# Patient Record
Sex: MALE | Race: WHITE | Employment: FULL TIME | URBAN - METROPOLITAN AREA
[De-identification: names, ages, dates, MRNs, and addresses within clinical notes are randomized per-mention and may not be internally consistent; named-entity substitution may affect disease eponyms.]

---

## 2019-04-12 ENCOUNTER — HOSPITAL ENCOUNTER (EMERGENCY)
Age: 59
Discharge: HOME | End: 2019-04-12
Payer: COMMERCIAL

## 2019-04-12 ENCOUNTER — APPOINTMENT (OUTPATIENT)
Dept: CT IMAGING | Age: 59
End: 2019-04-12
Payer: COMMERCIAL

## 2019-04-12 VITALS
SYSTOLIC BLOOD PRESSURE: 112 MMHG | TEMPERATURE: 99.3 F | WEIGHT: 190 LBS | OXYGEN SATURATION: 99 % | HEART RATE: 77 BPM | BODY MASS INDEX: 27.2 KG/M2 | RESPIRATION RATE: 16 BRPM | DIASTOLIC BLOOD PRESSURE: 65 MMHG | HEIGHT: 70 IN

## 2019-04-12 DIAGNOSIS — G43.909 MIGRAINE WITHOUT STATUS MIGRAINOSUS, NOT INTRACTABLE, UNSPECIFIED MIGRAINE TYPE: Primary | ICD-10-CM

## 2019-04-12 PROCEDURE — 6370000000 HC RX 637 (ALT 250 FOR IP): Performed by: PHYSICIAN ASSISTANT

## 2019-04-12 PROCEDURE — 6360000002 HC RX W HCPCS: Performed by: PHYSICIAN ASSISTANT

## 2019-04-12 PROCEDURE — 96374 THER/PROPH/DIAG INJ IV PUSH: CPT

## 2019-04-12 PROCEDURE — 70450 CT HEAD/BRAIN W/O DYE: CPT

## 2019-04-12 PROCEDURE — 99284 EMERGENCY DEPT VISIT MOD MDM: CPT

## 2019-04-12 PROCEDURE — 96375 TX/PRO/DX INJ NEW DRUG ADDON: CPT

## 2019-04-12 RX ORDER — BUTALBITAL, ACETAMINOPHEN AND CAFFEINE 300; 40; 50 MG/1; MG/1; MG/1
1 CAPSULE ORAL EVERY 4 HOURS PRN
Qty: 20 CAPSULE | Refills: 0 | Status: SHIPPED | OUTPATIENT
Start: 2019-04-12

## 2019-04-12 RX ORDER — KETOROLAC TROMETHAMINE 30 MG/ML
30 INJECTION, SOLUTION INTRAMUSCULAR; INTRAVENOUS ONCE
Status: COMPLETED | OUTPATIENT
Start: 2019-04-12 | End: 2019-04-12

## 2019-04-12 RX ORDER — DIPHENHYDRAMINE HCL 25 MG
25 TABLET ORAL ONCE
Status: COMPLETED | OUTPATIENT
Start: 2019-04-12 | End: 2019-04-12

## 2019-04-12 RX ORDER — METOCLOPRAMIDE HYDROCHLORIDE 5 MG/ML
10 INJECTION INTRAMUSCULAR; INTRAVENOUS ONCE
Status: COMPLETED | OUTPATIENT
Start: 2019-04-12 | End: 2019-04-12

## 2019-04-12 RX ADMIN — METOCLOPRAMIDE 10 MG: 5 INJECTION, SOLUTION INTRAMUSCULAR; INTRAVENOUS at 12:00

## 2019-04-12 RX ADMIN — DIPHENHYDRAMINE HCL 25 MG: 25 TABLET ORAL at 12:08

## 2019-04-12 RX ADMIN — KETOROLAC TROMETHAMINE 30 MG: 30 INJECTION, SOLUTION INTRAMUSCULAR at 11:54

## 2019-04-12 ASSESSMENT — PAIN SCALES - GENERAL
PAINLEVEL_OUTOF10: 9
PAINLEVEL_OUTOF10: 9

## 2019-04-12 ASSESSMENT — PAIN DESCRIPTION - PAIN TYPE: TYPE: ACUTE PAIN

## 2019-04-12 ASSESSMENT — PAIN DESCRIPTION - LOCATION: LOCATION: HEAD

## 2019-04-12 NOTE — ED PROVIDER NOTES
eMERGENCY dEPARTMENT eNCOUnter      PCP: Deepa Vinson MD    [address] [facility]    Chief Complaint   Patient presents with    Headache     since [phone] last night, pain to whole head, pt reports sensitivity to light, vision problems to L eye, emesis       HPI    Barbara Canela is a 59 y.o. male who presents with gradual onset of a headache since onset  last night. Patient symptoms began with black spots in his peripheral vision of his left eye and began having headache diffusely across forehead. He has light sensitivity, nausea and vomiting associated. Pain severity on arrival is 9 out of 10. Patient admits to history of migraines which typically starts with visual changes in the peripheral aspect of left eye. He states he does not have migraines frequently, only a handful in the past several years. He states symptoms began after facial trauma with nasal fracture several years ago. He was prescribed Imitrex by his primary care doctor, however forgot that he had this medication and not take it when he was having and the visual symptoms last evening. He goes today without improvement of migraine. He denies any decreased sensation or weakness of extremities. No recent trauma or injury. No use of blood thinning medications. Patient denies fever or recent illness. Denies confusion or memory loss. Denies light-headedness, dizziness, or LOC. Denies stiff neck. Denies weakness or sensory changes. Denies visual changes. REVIEW OF SYSTEMS   Constitutional:  Denies fever, chills, weight loss or weakness   Neurologic:  See HPI. Denies confusion or memory loss. Denies light-headedness, dizziness, or LOC. Denies stiff neck. Denies weakness or sensory changes   Eyes:   See HPI  HENT:  Denies sore throat or ear pain   GI  Denies abdominal pain.  + nausea, vomiting, no diarrhea. :  Denies Dysuria or Hematuria. Musculoskeletal:  Denies back pain.      Skin:  Denies rash   Endocrine:  Denies polyuria or polydypsia Lymphatic:  Denies swollen glands   Psychiatric:   Denies depression, suicidal ideation or homicidal ideation     All other review of systems negative at this time  See HPI and nursing notes for additional information      PAST MEDICAL & SURGICAL HISTORY    Past Medical History:   Diagnosis Date    Hypertension      Past Surgical History:   Procedure Laterality Date    KNEE ARTHROSCOPY         CURRENT MEDICATIONS    Current Outpatient Rx   Medication Sig Dispense Refill    butalbital-APAP-caffeine (FIORICET) -40 MG CAPS per capsule Take 1 capsule by mouth every 4 hours as needed for Headaches 20 capsule 0    oxyCODONE-acetaminophen (PERCOCET) 5-325 MG per tablet Take 1 tablet by mouth every 6 hours as needed for Pain 20 tablet 0    lisinopril-hydrochlorothiazide (PRINZIDE;ZESTORETIC) 10-12.5 MG per tablet Take 1 tablet by mouth daily.  pantoprazole (PROTONIX) 40 MG tablet Take 40 mg by mouth daily.  sildenafil (VIAGRA) 50 MG tablet Take 50 mg by mouth as needed.  Patient states only takes one half to one quarter of a pill at a time          ALLERGIES    No Known Allergies    SOCIAL & FAMILY HISTORY    Social History     Socioeconomic History    Marital status:      Spouse name: None    Number of children: None    Years of education: None    Highest education level: None   Occupational History    None   Social Needs    Financial resource strain: None    Food insecurity:     Worry: None     Inability: None    Transportation needs:     Medical: None     Non-medical: None   Tobacco Use    Smoking status: Never Smoker    Smokeless tobacco: Never Used   Substance and Sexual Activity    Alcohol use: Yes     Comment: Very rare    Drug use: No    Sexual activity: Yes     Partners: Female   Lifestyle    Physical activity:     Days per week: None     Minutes per session: None    Stress: None   Relationships    Social connections:     Talks on phone: None     Gets together: None hallucinations        IMAGING:   CT HEAD WO CONTRAST   Final Result   Negative CT brain with no acute intracranial abnormality. ED COURSE & MEDICAL DECISION MAKING       Vital signs and nursing notes reviewed during ED course. I have independently evaluated this patient . Supervising MD present in the Emergency Department, available for consultation, throughout entirety of  patient care. Patient presents above with headache with similar presentation to migraines patient has had in the past, however he does not frequently get migraines. Head CT initiated from triage. On my evaluation, he has significant light sensitivity. He is neurologically intact on exam, does report some blurring of vision and left periphery which she has also had in the past with migraines. He is given dose of Toradol, Reglan, Benadryl. Head CT without acute abnormality. On reexamination, patient reports significant improvement of symptoms, sitting up in bed with lights on. He states area of blurred vision has improved. History and exam does seem most consistent with a typical migraine for patient, however did discuss with patient and wife further evaluation including CTA and lumbar puncture. She has had improvement of symptoms, he declines any further imaging at this time. He understands that we cannot completely evaluate for vascular abnormality or occult bleed is etiology of symptoms. Patient is requesting to be discharged and believe this is reasonable as he has had significant improvement of symptoms. He is ambulatory in the emergency Department without obvious gait abnormality. He agrees to return with any acutely worsening symptoms. Otherwise he'll follow-up with primary care provider and he is also given urology follow-up. I estimate there is LOW risk for BACTERIAL MENINGITIS, SUBARACHNOID HEMORRHAGE, ISCHEMIC OR HEMORRHAGE CVA, or ACUTE CORONARY SYNDROME thus I consider the discharge disposition reasonable. Yisel Chagisela (and/or their family) and I have discussed the diagnosis and risks, and we agree with discharging home with close follow-up with PCP in the next 2-3 days. We also discussed returning to the Emergency Department immediately if new or worsening symptoms occur which included any change in nature or severity of headaches or any new symptoms including but not limited to fever, chills, neck pain, neck stiffness, confusion, memory loss, visual changes, and gait changes, were discussed in detail with patient and/or family who understands and agrees. Clinical  IMPRESSION    1. Migraine without status migrainosus, not intractable, unspecified migraine type            Comment: Please note this report has been produced using speech recognition software and may contain errors related to that system including errors in grammar, punctuation, and spelling, as well as words and phrases that may be inappropriate. If there are any questions or concerns please feel free to contact the dictating provider for clarification.             Lauretta Bumpers, PA  04/12/19 0110

## 2019-04-12 NOTE — ED TRIAGE NOTES
Pt has history of migraines, but has not had one in 4 years. Pt took Imitrex at 0200 with no relief. Imitrex is .

## 2019-04-12 NOTE — ED NOTES
Discharge instructions, prescriptions, and follow up care reviewed. Questions addressed. Vanessa Colón, [facility]  04/12/19 4882

## 2023-10-05 ENCOUNTER — HOSPITAL ENCOUNTER (OUTPATIENT)
Dept: RADIATION ONCOLOGY | Age: 63
Discharge: HOME | End: 2023-10-05
Payer: MEDICARE

## 2023-10-05 VITALS
TEMPERATURE: 98.2 F | DIASTOLIC BLOOD PRESSURE: 78 MMHG | HEART RATE: 69 BPM | HEIGHT: 70 IN | OXYGEN SATURATION: 97 % | BODY MASS INDEX: 29.29 KG/M2 | SYSTOLIC BLOOD PRESSURE: 163 MMHG | WEIGHT: 204.6 LBS

## 2023-10-05 PROCEDURE — 99205 OFFICE O/P NEW HI 60 MIN: CPT | Performed by: RADIOLOGY

## 2023-10-05 PROCEDURE — 99202 OFFICE O/P NEW SF 15 MIN: CPT

## 2023-10-05 RX ORDER — RABEPRAZOLE SODIUM 20 MG/1
TABLET, DELAYED RELEASE ORAL
COMMUNITY
Start: 2023-07-18

## 2023-10-05 NOTE — CONSULTS
Radiation Oncology Consultation  Encounter Date: 10/5/2023 9:10 AM    Mr. Darrel Vázquez is a 70 y.o. male  : 1960  MRN: [id number]  Acct Number: [de-identified]  Requesting Provider: No att. providers found        CONSULTANT: Johnna Linton MD    PHYSICIANS:   Primary Care: Meghan Elkins MD   Urology: Dr. Nura Cabrera    DIAGNOSIS: low risk prostate cancer [place] 3+3, PSA 6.2, [id number]    HPI:     Mr. Alize Mims is a 78-year-old male who presents for consultation of radiotherapy treatment options above diagnosis. PSA increased to 6.2 on 2023. MRI prostate 3/2022 showed bilobar piards 3 lesions. He underwent fusion biopsy on 2022 and pathology revealed 2 cores of Mat 3+3, no PNI. MRI prostate 23 showed PIRADS 4 lesions (previously PIRADS 3 in RML peripheral zone & another in the right apex medial peripheral zone). Gland size 73 grams on MRI. He spoke to Urology about treatment options and was referred to radiation oncology to discuss nonsurgical intervention. He has not had radiation therapy before. He does not have a cardiac implanted device. He is not on flomax. He is taking cialis 5 mg daily for ED.        Past Medical History:   Diagnosis Date    Hypertension         Past Surgical History:   Procedure Laterality Date    KNEE ARTHROSCOPY         Family History   Problem Relation Age of Onset    Heart Disease Father        Social History     Socioeconomic History    Marital status:      Spouse name: Not on file    Number of children: Not on file    Years of education: Not on file    Highest education level: Not on file   Occupational History    Not on file   Tobacco Use    Smoking status: Never    Smokeless tobacco: Never   Substance and Sexual Activity    Alcohol use: Yes     Comment: Very rare    Drug use: No    Sexual activity: Yes     Partners: Female   Other Topics Concern    Not on file   Social History Narrative    Not on file     Social Determinants of Health     Financial

## 2024-03-20 ENCOUNTER — HOSPITAL ENCOUNTER (OUTPATIENT)
Dept: RADIATION ONCOLOGY | Age: 64
Discharge: HOME | End: 2024-03-20
Payer: MEDICARE

## 2024-03-20 VITALS
SYSTOLIC BLOOD PRESSURE: 147 MMHG | WEIGHT: 204 LBS | DIASTOLIC BLOOD PRESSURE: 70 MMHG | HEART RATE: 63 BPM | HEIGHT: 69 IN | OXYGEN SATURATION: 97 % | BODY MASS INDEX: 30.21 KG/M2 | RESPIRATION RATE: 16 BRPM | TEMPERATURE: 98.9 F

## 2024-03-20 PROCEDURE — 99215 OFFICE O/P EST HI 40 MIN: CPT | Performed by: RADIOLOGY

## 2024-03-20 PROCEDURE — 99212 OFFICE O/P EST SF 10 MIN: CPT | Performed by: RADIOLOGY

## 2024-03-20 NOTE — PROGRESS NOTES
Dhaval L Luba  3/20/2024    Patient is seen today for follow up.     Vitals:    03/20/24 1341   BP: (!) 147/70   Pulse: 63   Resp: 16   Temp: 98.9 °F (37.2 °C)   SpO2: 97%        Oxygen Therapy  SpO2: 97 %  Pulse Oximeter Device Mode: Continuous  Pulse Oximeter Device Location: Finger  O2 Device: None (Room air)  Skin Assessment: Clean, dry, & intact    Wt Readings from Last 3 Encounters:   03/20/24 92.5 kg (204 lb)   10/05/23 92.8 kg (204 lb 9.6 oz)   04/12/19 86.2 kg (190 lb)       Pain Assessment  Pain Assessment: None - Denies Pain  Denies Need for Intervention     No Known Allergies     Current Outpatient Medications   Medication Sig Dispense Refill    RABEprazole (ACIPHEX) 20 MG tablet TAKE ONCE BY MOUTH DAILY      lisinopril-hydrochlorothiazide (PRINZIDE;ZESTORETIC) 10-12.5 MG per tablet Take 1 tablet by mouth daily      pantoprazole (PROTONIX) 40 MG tablet Take 1 tablet by mouth daily      sildenafil (VIAGRA) 50 MG tablet Take 1 tablet by mouth as needed Patient states only takes one half to one quarter of a pill at a time       No current facility-administered medications for this encounter.        Additional Comments: Pt here for follow up to review biopsy results.    Electronically signed by Aye Andre CMA on 3/20/2024 at 1:42 PM             
MCHC 34.6 02/20/2012 04:00 PM    RDW 12.1 02/20/2012 04:00 PM     02/20/2012 04:00 PM    MPV 8.5 02/20/2012 04:00 PM     CMP:  Lab Results   Component Value Date/Time     02/20/2012 04:00 PM    K 3.8 02/20/2012 04:00 PM     02/20/2012 04:00 PM    CO2 33 02/20/2012 04:00 PM    BUN 21 02/20/2012 04:00 PM    CREATININE 0.9 02/20/2012 04:00 PM    GFRAA >60 02/20/2012 04:00 PM    LABGLOM >60 02/20/2012 04:00 PM    PROT 7.1 02/20/2012 04:00 PM    LABALBU 4.6 02/20/2012 04:00 PM    CALCIUM 9.9 02/20/2012 04:00 PM    BILITOT 0.4 02/20/2012 04:00 PM    ALKPHOS 54 02/20/2012 04:00 PM    AST 18 02/20/2012 04:00 PM    ALT 22 02/20/2012 04:00 PM     MEDICATIONS:   Current Outpatient Medications   Medication Sig Dispense Refill    RABEprazole (ACIPHEX) 20 MG tablet TAKE ONCE BY MOUTH DAILY      lisinopril-hydrochlorothiazide (PRINZIDE;ZESTORETIC) 10-12.5 MG per tablet Take 1 tablet by mouth daily      pantoprazole (PROTONIX) 40 MG tablet Take 1 tablet by mouth daily      sildenafil (VIAGRA) 50 MG tablet Take 1 tablet by mouth as needed Patient states only takes one half to one quarter of a pill at a time       No current facility-administered medications for this encounter.       EXAMINATION:   Vitals:    03/20/24 1341   BP: (!) 147/70   Pulse: 63   Resp: 16   Temp: 98.9 °F (37.2 °C)   SpO2: 97%     The patient is in no acute distress.  Neck: Supple no preauricular postauricular, submental, submandibular, cervical, supraclavicular, or infraclavicular lymphadenopathy is present.  Heart: Regular rate and rhythm.  No murmurs, clicks, or gallops are present.  Lungs: Bilaterally clear to auscultation.  No rales, rhonchi, or wheezing are present.  Abdomen: Soft, nontender and nondistended.  No hepatosplenomegaly or masses are appreciated.  Rectal exam: deferred  Extremities: No cyanosis, clubbing, or edema is present.    ASSESSMENT AND PLAN:     Dhaval Verduzco is a 63 y.o. male initially diagnosed with low risk

## 2024-03-20 NOTE — PLAN OF CARE
Radiation education and handouts given. Side effects and management reviewed with pt. All questions answered and pt voices understanding .    Bowel and bladder prep explained and written copy given. Pt to return to [facility] tomorrow at 8:00 AM for CT/SIM for radiation planning.     Nursing Care Plan for Pelvic/Prostate Radiation    Pain related to cancer diagnosis and treatment.    Interventions   Pain assessment.   Monitor pharmacological pain medication.   Teach relaxation and repositioning techniques.     Expected Outcome   Maintain patient's acceptable pain level or <5 on pain scale.       Knowledge deficit related to diagnosis and treatment plan.    Interventions   Assess patient's ability to comprehend diagnosis and treatment plan.   Provide educational materials and teaching regarding plan of care.   Provide emotional support and continued education.   Refer to psychosocial coordinator if further assistance needed.     Expected Outcome   Patient voices understanding of diagnosis and treatment plan.       Altered skin integrity related to treatment.    Interventions   Evaluation of skin integrity at therapy site.   Advise patient on appropriate skin care.   Instruct patient on recommended lotions/ointments/creams/dressing changes to use on therapy site.     Expected Outcome   Minimize adverse skin reaction/infection at treatment site.       Altered genitourinary function.    Interventions   Evaluate for treatment side effects.   Evaluate treatment modalities for effectiveness.   Monitor I & O.     Expected Outcome   Patient with minimal urinary complications as evidenced by adequate urinary output.       Gastrointestinal disturbances due to treatment process.    Interventions   Evaluate for treatment side effects.   Evaluate treatment modalities for effectiveness.   Initiate and educate on appropriate bowel program if needed.     Expected Outcome   Patient with minimal bowel complications and controlled management

## 2024-03-21 ENCOUNTER — HOSPITAL ENCOUNTER (OUTPATIENT)
Dept: RADIATION ONCOLOGY | Age: 64
Discharge: HOME | End: 2024-03-21
Payer: MEDICARE

## 2024-03-21 PROCEDURE — 77334 RADIATION TREATMENT AID(S): CPT | Performed by: RADIOLOGY

## 2024-04-04 ENCOUNTER — HOSPITAL ENCOUNTER (OUTPATIENT)
Dept: RADIATION ONCOLOGY | Age: 64
Discharge: HOME | End: 2024-04-04
Payer: MEDICARE

## 2024-04-04 PROCEDURE — 77385 HC NTSTY MODUL RAD TX DLVR SMPL: CPT | Performed by: RADIOLOGY

## 2024-04-05 ENCOUNTER — HOSPITAL ENCOUNTER (OUTPATIENT)
Dept: RADIATION ONCOLOGY | Age: 64
Discharge: HOME | End: 2024-04-05
Payer: MEDICARE

## 2024-04-05 PROCEDURE — 77385 HC NTSTY MODUL RAD TX DLVR SMPL: CPT | Performed by: RADIOLOGY

## 2024-04-08 ENCOUNTER — HOSPITAL ENCOUNTER (OUTPATIENT)
Dept: RADIATION ONCOLOGY | Age: 64
Discharge: HOME | End: 2024-04-08
Payer: MEDICARE

## 2024-04-08 PROCEDURE — 77385 HC NTSTY MODUL RAD TX DLVR SMPL: CPT | Performed by: RADIOLOGY

## 2024-04-08 PROCEDURE — 77014 CHG CT GUIDANCE RADIATION THERAPY FLDS PLACEMENT: CPT | Performed by: RADIOLOGY

## 2024-04-09 ENCOUNTER — HOSPITAL ENCOUNTER (OUTPATIENT)
Dept: RADIATION ONCOLOGY | Age: 64
Discharge: HOME | End: 2024-04-09
Payer: MEDICARE

## 2024-04-09 VITALS — WEIGHT: 207.4 LBS | BODY MASS INDEX: 30.2 KG/M2

## 2024-04-09 PROCEDURE — 77014 CHG CT GUIDANCE RADIATION THERAPY FLDS PLACEMENT: CPT | Performed by: RADIOLOGY

## 2024-04-09 PROCEDURE — 77385 HC NTSTY MODUL RAD TX DLVR SMPL: CPT | Performed by: RADIOLOGY

## 2024-04-09 ASSESSMENT — PAIN SCALES - GENERAL: PAINLEVEL_OUTOF10: 0

## 2024-04-09 NOTE — PROGRESS NOTES
Weekly Radiation Treatment Progress Note    DATE OF SERVICE: 4/9/2024     DIAGNOSIS:  favorable intermediate risk prostate cancer [place] 3+4, PSA 6.2, [id number], initially diagnosed [place] 3+3 low risk 5/2022     TREATMENT COURSE:         Site: Prostate  Current Total Radiation Dose: 1000 cGy  Fraction: 4/28    Pt doing well. Energy good.  No dysuria/hematuria. Up 2x/pm  Some nausea after treatments he thinks is anxiety  Some pressure in the prostate area after treatment.     EXAM  Wt Readings from Last 3 Encounters:   03/20/24 92.5 kg (204 lb)   10/05/23 92.8 kg (204 lb 9.6 oz)   04/12/19 86.2 kg (190 lb)     NAD    Setup images, chart, plan reviewed    A/P:   Tolerating RT well  Continue RT as planned      Electronically signed by Kory Carey MD on 4/9/2024 at 7:13 AM\

## 2024-04-10 ENCOUNTER — HOSPITAL ENCOUNTER (OUTPATIENT)
Dept: RADIATION ONCOLOGY | Age: 64
Discharge: HOME | End: 2024-04-10
Payer: MEDICARE

## 2024-04-10 PROCEDURE — 77336 RADIATION PHYSICS CONSULT: CPT | Performed by: RADIOLOGY

## 2024-04-10 PROCEDURE — 77427 RADIATION TX MANAGEMENT X5: CPT | Performed by: RADIOLOGY

## 2024-04-10 PROCEDURE — 77385 HC NTSTY MODUL RAD TX DLVR SMPL: CPT | Performed by: RADIOLOGY

## 2024-04-10 PROCEDURE — 77014 CHG CT GUIDANCE RADIATION THERAPY FLDS PLACEMENT: CPT | Performed by: RADIOLOGY

## 2024-04-11 ENCOUNTER — HOSPITAL ENCOUNTER (OUTPATIENT)
Dept: RADIATION ONCOLOGY | Age: 64
Discharge: HOME | End: 2024-04-11
Payer: MEDICARE

## 2024-04-11 PROCEDURE — 77385 HC NTSTY MODUL RAD TX DLVR SMPL: CPT | Performed by: RADIOLOGY

## 2024-04-12 ENCOUNTER — HOSPITAL ENCOUNTER (OUTPATIENT)
Dept: RADIATION ONCOLOGY | Age: 64
Discharge: HOME | End: 2024-04-12
Payer: MEDICARE

## 2024-04-12 PROCEDURE — 77385 HC NTSTY MODUL RAD TX DLVR SMPL: CPT | Performed by: RADIOLOGY

## 2024-04-15 ENCOUNTER — HOSPITAL ENCOUNTER (OUTPATIENT)
Dept: RADIATION ONCOLOGY | Age: 64
Discharge: HOME | End: 2024-04-15
Payer: MEDICARE

## 2024-04-15 PROCEDURE — 77385 HC NTSTY MODUL RAD TX DLVR SMPL: CPT | Performed by: RADIOLOGY

## 2024-04-15 PROCEDURE — 77014 CHG CT GUIDANCE RADIATION THERAPY FLDS PLACEMENT: CPT | Performed by: RADIOLOGY

## 2024-04-16 ENCOUNTER — HOSPITAL ENCOUNTER (OUTPATIENT)
Dept: RADIATION ONCOLOGY | Age: 64
Discharge: HOME | End: 2024-04-16
Payer: MEDICARE

## 2024-04-16 VITALS — BODY MASS INDEX: 30.49 KG/M2 | WEIGHT: 209.4 LBS

## 2024-04-16 PROCEDURE — 77385 HC NTSTY MODUL RAD TX DLVR SMPL: CPT | Performed by: RADIOLOGY

## 2024-04-16 PROCEDURE — 77014 CHG CT GUIDANCE RADIATION THERAPY FLDS PLACEMENT: CPT | Performed by: RADIOLOGY

## 2024-04-16 ASSESSMENT — PAIN SCALES - GENERAL: PAINLEVEL_OUTOF10: 0

## 2024-04-16 NOTE — PROGRESS NOTES
Weekly Radiation Treatment Progress Note    DATE OF SERVICE: 4/16/2024     DIAGNOSIS:  favorable intermediate risk prostate cancer [place] 3+4, PSA 6.2, [id number], initially diagnosed [place] 3+3 low risk 5/2022     TREATMENT COURSE:         Site: Prostate  Current Total Radiation Dose: 2250 cGy  Fraction: 9/28    Pt doing well. Energy good.  No dysuria/hematuria. Up 2x/pm  No diarrhea  Some pressure in the groin but no dysuria or other bladder issues. Not on flomax.     EXAM  Wt Readings from Last 3 Encounters:   04/09/24 94.1 kg (207 lb 6.4 oz)   03/20/24 92.5 kg (204 lb)   10/05/23 92.8 kg (204 lb 9.6 oz)     NAD    Setup images, chart, plan reviewed    A/P:   Tolerating RT well  Continue RT as planned      Electronically signed by Kory Carey MD on 4/16/2024 at 7:15 AM

## 2024-04-17 ENCOUNTER — HOSPITAL ENCOUNTER (OUTPATIENT)
Dept: RADIATION ONCOLOGY | Age: 64
Discharge: HOME | End: 2024-04-17
Payer: MEDICARE

## 2024-04-17 PROCEDURE — 77427 RADIATION TX MANAGEMENT X5: CPT | Performed by: RADIOLOGY

## 2024-04-17 PROCEDURE — 77385 HC NTSTY MODUL RAD TX DLVR SMPL: CPT | Performed by: RADIOLOGY

## 2024-04-17 PROCEDURE — 77336 RADIATION PHYSICS CONSULT: CPT | Performed by: RADIOLOGY

## 2024-04-17 PROCEDURE — 77014 CHG CT GUIDANCE RADIATION THERAPY FLDS PLACEMENT: CPT | Performed by: RADIOLOGY

## 2024-04-18 ENCOUNTER — HOSPITAL ENCOUNTER (OUTPATIENT)
Dept: RADIATION ONCOLOGY | Age: 64
Discharge: HOME | End: 2024-04-18
Payer: MEDICARE

## 2024-04-18 PROCEDURE — 77385 HC NTSTY MODUL RAD TX DLVR SMPL: CPT | Performed by: RADIOLOGY

## 2024-04-19 ENCOUNTER — HOSPITAL ENCOUNTER (OUTPATIENT)
Dept: RADIATION ONCOLOGY | Age: 64
Discharge: HOME | End: 2024-04-19
Payer: MEDICARE

## 2024-04-19 PROCEDURE — 77385 HC NTSTY MODUL RAD TX DLVR SMPL: CPT | Performed by: RADIOLOGY

## 2024-04-22 ENCOUNTER — HOSPITAL ENCOUNTER (OUTPATIENT)
Dept: RADIATION ONCOLOGY | Age: 64
Discharge: HOME | End: 2024-04-22
Payer: MEDICARE

## 2024-04-22 PROCEDURE — 77385 HC NTSTY MODUL RAD TX DLVR SMPL: CPT | Performed by: RADIOLOGY

## 2024-04-22 PROCEDURE — 77014 CHG CT GUIDANCE RADIATION THERAPY FLDS PLACEMENT: CPT | Performed by: RADIOLOGY

## 2024-04-23 ENCOUNTER — HOSPITAL ENCOUNTER (OUTPATIENT)
Dept: RADIATION ONCOLOGY | Age: 64
Discharge: HOME | End: 2024-04-23
Payer: MEDICARE

## 2024-04-23 VITALS — WEIGHT: 209.8 LBS | BODY MASS INDEX: 30.55 KG/M2

## 2024-04-23 PROCEDURE — 77385 HC NTSTY MODUL RAD TX DLVR SMPL: CPT | Performed by: RADIOLOGY

## 2024-04-23 PROCEDURE — 77014 CHG CT GUIDANCE RADIATION THERAPY FLDS PLACEMENT: CPT | Performed by: RADIOLOGY

## 2024-04-23 ASSESSMENT — PAIN SCALES - GENERAL: PAINLEVEL_OUTOF10: 0

## 2024-04-23 NOTE — PLAN OF CARE
Care plan reviewed.    _ Pt reporting some increased urinary frequency and nocturia x 1-2 times/night, denies need for meds at this time.    _ Pt with looser stools but reports not a problem at this time, advised pt to inform staff if increases.

## 2024-04-23 NOTE — PROGRESS NOTES
Weekly Radiation Treatment Progress Note    DATE OF SERVICE: 4/23/2024     DIAGNOSIS:   favorable intermediate risk prostate cancer [place] 3+4, PSA 6.2, [id number], initially diagnosed [place] 3+3 low risk 5/2022     TREATMENT COURSE:         Site: Prostate  Current Total Radiation Dose: 3500 cGy  Fraction: 14/28    Pt doing well, some fatigue  Some nocturia. Some looser bowel movements.     EXAM  Wt Readings from Last 3 Encounters:   04/16/24 95 kg (209 lb 6.4 oz)   04/09/24 94.1 kg (207 lb 6.4 oz)   03/20/24 92.5 kg (204 lb)     NAD    Setup images, chart, plan reviewed    A/P:   Tolerating RT well  Continue RT as planned  Fluid restriction at night. Discussed flomax, not interested in it right now.      Electronically signed by Kory Carey MD on 4/23/2024 at 7:15 AM

## 2024-04-24 ENCOUNTER — HOSPITAL ENCOUNTER (OUTPATIENT)
Dept: RADIATION ONCOLOGY | Age: 64
Discharge: HOME | End: 2024-04-24
Payer: MEDICARE

## 2024-04-24 PROCEDURE — 77385 HC NTSTY MODUL RAD TX DLVR SMPL: CPT | Performed by: RADIOLOGY

## 2024-04-24 PROCEDURE — 77336 RADIATION PHYSICS CONSULT: CPT | Performed by: RADIOLOGY

## 2024-04-24 PROCEDURE — 77014 CHG CT GUIDANCE RADIATION THERAPY FLDS PLACEMENT: CPT | Performed by: RADIOLOGY

## 2024-04-25 ENCOUNTER — HOSPITAL ENCOUNTER (OUTPATIENT)
Dept: RADIATION ONCOLOGY | Age: 64
Discharge: HOME | End: 2024-04-25
Payer: MEDICARE

## 2024-04-25 PROCEDURE — 77385 HC NTSTY MODUL RAD TX DLVR SMPL: CPT | Performed by: RADIOLOGY

## 2024-04-25 PROCEDURE — 77014 CHG CT GUIDANCE RADIATION THERAPY FLDS PLACEMENT: CPT | Performed by: RADIOLOGY

## 2024-04-26 ENCOUNTER — HOSPITAL ENCOUNTER (OUTPATIENT)
Dept: RADIATION ONCOLOGY | Age: 64
Discharge: HOME | End: 2024-04-26
Payer: MEDICARE

## 2024-04-26 PROCEDURE — 77385 HC NTSTY MODUL RAD TX DLVR SMPL: CPT | Performed by: RADIOLOGY

## 2024-04-29 ENCOUNTER — HOSPITAL ENCOUNTER (OUTPATIENT)
Dept: RADIATION ONCOLOGY | Age: 64
Discharge: HOME | End: 2024-04-29
Payer: MEDICARE

## 2024-04-29 PROCEDURE — 77385 HC NTSTY MODUL RAD TX DLVR SMPL: CPT | Performed by: RADIOLOGY

## 2024-04-29 PROCEDURE — 77014 CHG CT GUIDANCE RADIATION THERAPY FLDS PLACEMENT: CPT | Performed by: RADIOLOGY

## 2024-04-30 ENCOUNTER — HOSPITAL ENCOUNTER (OUTPATIENT)
Dept: RADIATION ONCOLOGY | Age: 64
Discharge: HOME | End: 2024-04-30
Payer: MEDICARE

## 2024-04-30 VITALS — WEIGHT: 206.2 LBS | BODY MASS INDEX: 30.02 KG/M2

## 2024-04-30 PROCEDURE — 77385 HC NTSTY MODUL RAD TX DLVR SMPL: CPT | Performed by: RADIOLOGY

## 2024-04-30 PROCEDURE — 77014 CHG CT GUIDANCE RADIATION THERAPY FLDS PLACEMENT: CPT | Performed by: RADIOLOGY

## 2024-04-30 ASSESSMENT — PAIN SCALES - GENERAL: PAINLEVEL_OUTOF10: 0

## 2024-04-30 NOTE — PROGRESS NOTES
Weekly Radiation Treatment Progress Note    DATE OF SERVICE: 4/30/2024     DIAGNOSIS:  favorable intermediate risk prostate cancer [place] 3+4, PSA 6.2, [id number], initially diagnosed [place] 3+3 low risk 5/2022     TREATMENT COURSE:       Site: Prostate  Current Total Radiation Dose: 4750 cGy  Fraction: 19/28    Pt doing well. Energy good.  Bladder function good. Some looser bowel movements, taking a few immodium, no eusebio diarrhea.     EXAM  Wt Readings from Last 3 Encounters:   04/23/24 95.2 kg (209 lb 12.8 oz)   04/16/24 95 kg (209 lb 6.4 oz)   04/09/24 94.1 kg (207 lb 6.4 oz)     NAD    Setup images, chart, plan reviewed    A/P:   Tolerating RT well  Continue RT as planned      Electronically signed by Kory Carey MD on 4/30/2024 at 7:11 AM

## 2024-04-30 NOTE — PLAN OF CARE
Care plan reviewed.     _ Pt continues to have increased urinary frequency and nocturia x 1-2 times/night, denies need for meds at this time.     _ Pt increased looser stools , taking Imodium PRN to help control

## 2024-05-01 ENCOUNTER — HOSPITAL ENCOUNTER (OUTPATIENT)
Dept: RADIATION ONCOLOGY | Age: 64
Discharge: HOME | End: 2024-05-01
Payer: MEDICARE

## 2024-05-01 PROCEDURE — 77385 HC NTSTY MODUL RAD TX DLVR SMPL: CPT | Performed by: RADIOLOGY

## 2024-05-01 PROCEDURE — 77427 RADIATION TX MANAGEMENT X5: CPT | Performed by: RADIOLOGY

## 2024-05-01 PROCEDURE — 77336 RADIATION PHYSICS CONSULT: CPT | Performed by: RADIOLOGY

## 2024-05-02 ENCOUNTER — HOSPITAL ENCOUNTER (OUTPATIENT)
Dept: RADIATION ONCOLOGY | Age: 64
Discharge: HOME | End: 2024-05-02
Payer: MEDICARE

## 2024-05-02 PROCEDURE — 77385 HC NTSTY MODUL RAD TX DLVR SMPL: CPT | Performed by: RADIOLOGY

## 2024-05-03 ENCOUNTER — HOSPITAL ENCOUNTER (OUTPATIENT)
Dept: RADIATION ONCOLOGY | Age: 64
Discharge: HOME | End: 2024-05-03
Payer: MEDICARE

## 2024-05-03 PROCEDURE — 77385 HC NTSTY MODUL RAD TX DLVR SMPL: CPT | Performed by: RADIOLOGY

## 2024-05-06 ENCOUNTER — HOSPITAL ENCOUNTER (OUTPATIENT)
Dept: RADIATION ONCOLOGY | Age: 64
Discharge: HOME | End: 2024-05-06
Payer: MEDICARE

## 2024-05-06 PROCEDURE — 77385 HC NTSTY MODUL RAD TX DLVR SMPL: CPT | Performed by: RADIOLOGY

## 2024-05-06 PROCEDURE — 77014 CHG CT GUIDANCE RADIATION THERAPY FLDS PLACEMENT: CPT | Performed by: RADIOLOGY

## 2024-05-07 ENCOUNTER — HOSPITAL ENCOUNTER (OUTPATIENT)
Dept: RADIATION ONCOLOGY | Age: 64
Discharge: HOME | End: 2024-05-07
Payer: MEDICARE

## 2024-05-07 VITALS — WEIGHT: 207 LBS | BODY MASS INDEX: 30.14 KG/M2

## 2024-05-07 DIAGNOSIS — C61 CANCER OF PROSTATE (HCC): Primary | ICD-10-CM

## 2024-05-07 PROCEDURE — 77014 CHG CT GUIDANCE RADIATION THERAPY FLDS PLACEMENT: CPT | Performed by: RADIOLOGY

## 2024-05-07 PROCEDURE — 77385 HC NTSTY MODUL RAD TX DLVR SMPL: CPT | Performed by: RADIOLOGY

## 2024-05-07 ASSESSMENT — PAIN SCALES - GENERAL: PAINLEVEL_OUTOF10: 0

## 2024-05-07 NOTE — PLAN OF CARE
Care plan reviewed, unchanged.     _ Pt continues to have increased urinary frequency and nocturia x 1-2 times/night, denies need for meds at this time.     _ Pt increased looser stools , taking Imodium PRN to help control

## 2024-05-07 NOTE — PROGRESS NOTES
Weekly Radiation Treatment Progress Note    DATE OF SERVICE: 5/7/2024     DIAGNOSIS:  favorable intermediate risk prostate cancer [place] 3+4, PSA 6.2, [id number], initially diagnosed [place] 3+3 low risk 5/2022     TREATMENT COURSE:     No bladder meds    Site: Prostate  Current Total Radiation Dose: 6000 cGy  Fraction: 24/28    Pt doing well. Energy good.  No dysuria/hematuria. Up 2x/pm  No diarrhea  No new issues.     EXAM  Wt Readings from Last 3 Encounters:   04/30/24 93.5 kg (206 lb 3.2 oz)   04/23/24 95.2 kg (209 lb 12.8 oz)   04/16/24 95 kg (209 lb 6.4 oz)     NAD    Setup images, chart, plan reviewed    A/P:   Tolerating RT well  Continue RT as planned  Once finished RTC 4-6 weeks with PSA beforehand.       Electronically signed by Kory Carey MD on 5/7/2024 at 7:09 AM

## 2024-05-08 ENCOUNTER — HOSPITAL ENCOUNTER (OUTPATIENT)
Dept: RADIATION ONCOLOGY | Age: 64
Discharge: HOME | End: 2024-05-08
Payer: MEDICARE

## 2024-05-08 PROCEDURE — 77427 RADIATION TX MANAGEMENT X5: CPT | Performed by: RADIOLOGY

## 2024-05-08 PROCEDURE — 77014 CHG CT GUIDANCE RADIATION THERAPY FLDS PLACEMENT: CPT | Performed by: RADIOLOGY

## 2024-05-08 PROCEDURE — 77336 RADIATION PHYSICS CONSULT: CPT | Performed by: RADIOLOGY

## 2024-05-08 PROCEDURE — 77385 HC NTSTY MODUL RAD TX DLVR SMPL: CPT | Performed by: RADIOLOGY

## 2024-05-09 ENCOUNTER — HOSPITAL ENCOUNTER (OUTPATIENT)
Dept: RADIATION ONCOLOGY | Age: 64
Discharge: HOME | End: 2024-05-09
Payer: MEDICARE

## 2024-05-09 PROCEDURE — 77385 HC NTSTY MODUL RAD TX DLVR SMPL: CPT | Performed by: RADIOLOGY

## 2024-05-09 PROCEDURE — 77014 CHG CT GUIDANCE RADIATION THERAPY FLDS PLACEMENT: CPT | Performed by: RADIOLOGY

## 2024-05-10 ENCOUNTER — HOSPITAL ENCOUNTER (OUTPATIENT)
Dept: RADIATION ONCOLOGY | Age: 64
Discharge: HOME | End: 2024-05-10
Payer: MEDICARE

## 2024-05-10 VITALS — BODY MASS INDEX: 30.29 KG/M2 | WEIGHT: 208 LBS

## 2024-05-10 PROCEDURE — 77385 HC NTSTY MODUL RAD TX DLVR SMPL: CPT | Performed by: RADIOLOGY

## 2024-05-10 ASSESSMENT — PAIN SCALES - GENERAL: PAINLEVEL_OUTOF10: 0

## 2024-05-10 NOTE — PLAN OF CARE
Care plan reviewed, unchanged.     _ Pt continues to have increased urinary frequency and nocturia x 1-2 times/night, still denies need for meds at this time.     _ Pt increased looser stools , taking Imodium PRN to help control

## 2024-05-10 NOTE — PROGRESS NOTES
Weekly Radiation Treatment Progress Note    DATE OF SERVICE: 5/10/2024     DIAGNOSIS:  favorable intermediate risk prostate cancer [place] 3+4, PSA 6.2, [id number], initially diagnosed [place] 3+3 low risk 5/2022     TREATMENT COURSE:         Site: Prostate  Current Total Radiation Dose: 6750 cGy    Pt doing well. Energy good.  No dysuria/hematuria. Up 2x/pm  No diarrhea    EXAM  Wt Readings from Last 3 Encounters:   05/07/24 93.9 kg (207 lb)   04/30/24 93.5 kg (206 lb 3.2 oz)   04/23/24 95.2 kg (209 lb 12.8 oz)     NAD    Setup images, chart, plan reviewed    A/P:   Tolerating RT well  Continue RT as planned  Finishes Monday, RTC 4-6 weeks with PSA       Electronically signed by Kory Carey MD on 5/10/2024 at 7:12 AM

## 2024-05-13 ENCOUNTER — HOSPITAL ENCOUNTER (OUTPATIENT)
Dept: RADIATION ONCOLOGY | Age: 64
Discharge: HOME | End: 2024-05-13
Payer: MEDICARE

## 2024-05-13 PROCEDURE — 77014 CHG CT GUIDANCE RADIATION THERAPY FLDS PLACEMENT: CPT | Performed by: RADIOLOGY

## 2024-05-13 PROCEDURE — 77336 RADIATION PHYSICS CONSULT: CPT | Performed by: RADIOLOGY

## 2024-05-13 PROCEDURE — 77385 HC NTSTY MODUL RAD TX DLVR SMPL: CPT | Performed by: RADIOLOGY

## 2024-05-13 NOTE — PROGRESS NOTES
Radiation Oncology  Treatment Completion Summary  Encounter Date: 2024 8:50 AM    Mr. Dhaval Verduzco is a 63 y.o. male  : 1960  MRN: [id number]  Acct Number: [id number]      FOLLOW UP PHYSICIANS:   Primary Care: Irvin Posey MD     DIAGNOSIS: favorable intermediate risk prostate cancer [place] 3+4, PSA 6.2, [id number], initially diagnosed [place] 3+3 low risk 2022     TREATMENT COURSE:     HISTORY:  Dhaval Verduzco is a 63 y.o. male with the above referenced diagnosis.  Complete details on history of present illness please see my initial consultation note.  He has recently completed a course of definitive radiation therapy as follows:    ANATOMIC SITE: prostate & prox SV  BEAM ORIENTATION: VMAT   ENERGY: 6 MV photons  DOSE PER FRACTION: 250 cGy  TOTAL DOSE: 7000 cGy; 28/28 fractions    ELAPSED DAYS: 24 - 24    TREATMENT TOLERANCE:   Overall, the patient tolerated his radiation therapy quite well.    FOLLOW-UP PLANS:   He is to return to see me in 1 month with a PSA prior, or to return sooner as needed.       Electronically signed by Kory Carey MD on 2024 at 8:50 AM

## 2024-06-05 ENCOUNTER — HOSPITAL ENCOUNTER (OUTPATIENT)
Dept: INFUSION THERAPY | Age: 64
Discharge: HOME | End: 2024-06-05
Payer: MEDICARE

## 2024-06-05 DIAGNOSIS — C61 CANCER OF PROSTATE (HCC): ICD-10-CM

## 2024-06-05 LAB — PSA ULTRASENSITIVE: 3.99 NG/ML (ref 0–4)

## 2024-06-05 PROCEDURE — 36415 COLL VENOUS BLD VENIPUNCTURE: CPT

## 2024-06-05 PROCEDURE — 84153 ASSAY OF PSA TOTAL: CPT

## 2024-06-12 ENCOUNTER — HOSPITAL ENCOUNTER (OUTPATIENT)
Dept: RADIATION ONCOLOGY | Age: 64
Discharge: HOME | End: 2024-06-12

## 2024-06-12 VITALS
TEMPERATURE: 99 F | BODY MASS INDEX: 30.6 KG/M2 | HEART RATE: 67 BPM | WEIGHT: 206.6 LBS | SYSTOLIC BLOOD PRESSURE: 127 MMHG | OXYGEN SATURATION: 94 % | DIASTOLIC BLOOD PRESSURE: 60 MMHG | HEIGHT: 69 IN

## 2024-06-12 DIAGNOSIS — C61 CANCER OF PROSTATE (HCC): Primary | ICD-10-CM

## 2024-06-12 NOTE — PROGRESS NOTES
[facility]   Radiation Oncology Center  [address] [place], [place] [address]  Phone: [phone]  Fax: [phone]    RADIATION ONCOLOGY FOLLOW UP REPORT    PATIENT NAME:  Dhaval Verduzco              : 1960  MEDICAL RECORD NO: [id number]    [facility] NO: [id number]        PROVIDER: Kory Carey MD    DATE OF SERVICE: 2024     Other Physicians:    DIAGNOSIS:  favorable intermediate risk prostate cancer [place] 3+4, PSA 6.2, [id number], initially diagnosed [place] 3+3 low risk 2022     TREATMENT COURSE: definitive radiation therapy 70Gy/28fx completed 24    HPI:   Dhaval Verduzco is a 63 y.o. male who has a history as above who returns today for routine follow-up visit.  He completed radiation 24 and did not have any major problems or issues. He was not on any meds for his bladder.     PSA 3.99 on 24.  He is not taking any medications for his bladder.  He denies any hematuria or hematochezia.  Does have a little bit of loose stools but he is no longer taking Imodium.    RADIOLOGIC STUDIES:    LABORATORY STUDIES:   CBC:   Lab Results   Component Value Date/Time    WBC 5.2 2012 04:00 PM    RBC 4.87 2012 04:00 PM    HGB 14.6 2012 04:00 PM    HCT 42.4 2012 04:00 PM    MCV 87.0 2012 04:00 PM    MCH 30.1 2012 04:00 PM    MCHC 34.6 2012 04:00 PM    RDW 12.1 2012 04:00 PM     2012 04:00 PM    MPV 8.5 2012 04:00 PM     CMP:  Lab Results   Component Value Date/Time     2012 04:00 PM    K 3.8 2012 04:00 PM     2012 04:00 PM    CO2 33 2012 04:00 PM    BUN 21 2012 04:00 PM    CREATININE 0.9 2012 04:00 PM    GFRAA >60 2012 04:00 PM    LABGLOM >60 2012 04:00 PM    PROT 7.1 2012 04:00 PM    CALCIUM 9.9 2012 04:00 PM    BILITOT 0.4 2012 04:00 PM    ALKPHOS 54 2012 04:00 PM    AST 18 2012 04:00 PM    ALT 22 2012 04:00

## 2024-06-12 NOTE — PROGRESS NOTES
Dhaval Verduzco  6/12/2024    Patient is seen today for follow up.     Vitals:    06/12/24 1525   BP: 127/60   Pulse: 67   Temp: 99 °F (37.2 °C)   SpO2: 94%        Oxygen Therapy  SpO2: 94 %  Pulse Oximetry Type: Intermittent  Pulse Oximeter Device Location: Right  O2 Device: None (Room air)  Skin Assessment: Clean, dry, & intact  Oxygen Therapy: None (Room air)    Wt Readings from Last 3 Encounters:   06/12/24 93.7 kg (206 lb 9.6 oz)   05/10/24 94.3 kg (208 lb)   05/07/24 93.9 kg (207 lb)       Pain Assessment  Pain Assessment: None - Denies Pain  Denies Need for Intervention     No Known Allergies     Current Outpatient Medications   Medication Sig Dispense Refill    RABEprazole (ACIPHEX) 20 MG tablet TAKE ONCE BY MOUTH DAILY      lisinopril-hydrochlorothiazide (PRINZIDE;ZESTORETIC) 10-12.5 MG per tablet Take 1 tablet by mouth daily      pantoprazole (PROTONIX) 40 MG tablet Take 1 tablet by mouth daily      sildenafil (VIAGRA) 50 MG tablet Take 1 tablet by mouth as needed Patient states only takes one half to one quarter of a pill at a time       No current facility-administered medications for this encounter.        Additional Comments: Patient states no concerns at this time.     Electronically signed by Annamaria Casillas MA on 6/12/2024 at 3:26 PM

## 2024-10-07 ENCOUNTER — HOSPITAL ENCOUNTER (OUTPATIENT)
Dept: INFUSION THERAPY | Age: 64
Discharge: HOME | End: 2024-10-07
Payer: MEDICARE

## 2024-10-07 DIAGNOSIS — C61 CANCER OF PROSTATE (HCC): ICD-10-CM

## 2024-10-07 LAB — PSA SERPL-MCNC: 2.07 NG/ML (ref 0–4)

## 2024-10-07 PROCEDURE — 84153 ASSAY OF PSA TOTAL: CPT

## 2024-10-07 PROCEDURE — 36415 COLL VENOUS BLD VENIPUNCTURE: CPT

## 2024-10-14 ENCOUNTER — HOSPITAL ENCOUNTER (OUTPATIENT)
Dept: RADIATION ONCOLOGY | Age: 64
Discharge: HOME | End: 2024-10-14
Payer: MEDICARE

## 2024-10-14 VITALS
RESPIRATION RATE: 16 BRPM | HEIGHT: 69 IN | SYSTOLIC BLOOD PRESSURE: 142 MMHG | HEART RATE: 67 BPM | DIASTOLIC BLOOD PRESSURE: 66 MMHG | WEIGHT: 208.6 LBS | BODY MASS INDEX: 30.9 KG/M2 | TEMPERATURE: 97.7 F | OXYGEN SATURATION: 97 %

## 2024-10-14 DIAGNOSIS — C61 CANCER OF PROSTATE (HCC): Primary | ICD-10-CM

## 2024-10-14 PROCEDURE — 99213 OFFICE O/P EST LOW 20 MIN: CPT | Performed by: RADIOLOGY

## 2024-10-14 NOTE — PROGRESS NOTES
[facility]   Radiation Oncology Center  [address] [place] [id number]  Phone: [phone]  Fax: [phone]    RADIATION ONCOLOGY FOLLOW UP REPORT    PATIENT NAME:  Dhaval Verduzco              : 1960  MEDICAL RECORD NO: [id number]    [facility] NO: [id number]        PROVIDER: Kory Carey MD    DATE OF SERVICE: 10/14/2024     DIAGNOSIS:  favorable intermediate risk prostate cancer [place] 3+4, PSA 6.2, [id number], initially diagnosed [place] 3+3 low risk 2022      TREATMENT COURSE: definitive radiation therapy 70Gy/28fx completed 24    HPI:   Dhaval Verduzco is a 64 y.o. male who has a history as above who returns today for routine follow-up visit.  He was last seen in clinic 24 and doing well at that time. PSA was 3.99 24. No bladder meds.     PSA 2.07 on 10/7/24.  Doing well without any new major issues or complaints.  Denies any hematuria or hematochezia.  No problems with his bowels.  Had some chest congestion of the past few months but otherwise doing better.  Does get up once at night every now and then.  Not interested in Flomax at this time.    Currently, the patient reports he's been doing well.  His energy level has been fairly good overall.  He denies any fevers, chills, or drenching night sweats.  His weight and appetite have been stable.  He denies any chest pain or shortness of breath.  He has not noticed any new lumps or bumps anywhere throughout his body.  He denies the new onset of bony pain.  He has not been experiencing any pain within the abdomen or pelvis.      RADIOLOGIC STUDIES:      LABORATORY STUDIES:   CBC:   Lab Results   Component Value Date/Time    WBC 5.2 2012 04:00 PM    RBC 4.87 2012 04:00 PM    HGB 14.6 2012 04:00 PM    HCT 42.4 2012 04:00 PM    MCV 87.0 2012 04:00 PM    MCH 30.1 2012 04:00 PM    MCHC 34.6 2012 04:00 PM    RDW 12.1 2012 04:00 PM     2012 04:00 PM

## 2024-10-14 NOTE — PROGRESS NOTES
Dhaval MAGAÑA Luba  10/14/2024    Patient is seen today for follow up.     Vitals:    10/14/24 0826   BP: (!) 142/66   Pulse: 67   Resp: 16   Temp: 97.7 °F (36.5 °C)   SpO2: 97%        Oxygen Therapy  SpO2: 97 %  Pulse Oximetry Type: Intermittent  Pulse Oximeter Device Location: Finger  O2 Device: None (Room air)    Wt Readings from Last 3 Encounters:   10/14/24 94.6 kg (208 lb 9.6 oz)   06/12/24 93.7 kg (206 lb 9.6 oz)   05/10/24 94.3 kg (208 lb)       Pain Assessment  Pain Assessment: None - Denies Pain  Denies Need for Intervention     No Known Allergies     Current Outpatient Medications   Medication Sig Dispense Refill    RABEprazole (ACIPHEX) 20 MG tablet TAKE ONCE BY MOUTH DAILY      lisinopril-hydrochlorothiazide (PRINZIDE;ZESTORETIC) 10-12.5 MG per tablet Take 1 tablet by mouth daily      pantoprazole (PROTONIX) 40 MG tablet Take 1 tablet by mouth daily      sildenafil (VIAGRA) 50 MG tablet Take 1 tablet by mouth as needed Patient states only takes one half to one quarter of a pill at a time       No current facility-administered medications for this encounter.        Additional Comments: pt here for a f/u rad appt today    Electronically signed by Lizz Gonzalez MA on 10/14/2024 at 8:28 AM

## 2025-02-14 ENCOUNTER — HOSPITAL ENCOUNTER (OUTPATIENT)
Dept: INFUSION THERAPY | Age: 65
Discharge: HOME | End: 2025-02-14
Payer: MEDICARE

## 2025-02-14 DIAGNOSIS — C61 CANCER OF PROSTATE (HCC): ICD-10-CM

## 2025-02-14 LAB — PSA SERPL-MCNC: 1.93 NG/ML (ref 0–4)

## 2025-02-14 PROCEDURE — 84153 ASSAY OF PSA TOTAL: CPT

## 2025-02-14 PROCEDURE — 36415 COLL VENOUS BLD VENIPUNCTURE: CPT

## 2025-02-20 ENCOUNTER — HOSPITAL ENCOUNTER (OUTPATIENT)
Dept: RADIATION ONCOLOGY | Age: 65
Discharge: HOME | End: 2025-02-20
Payer: MEDICARE

## 2025-02-20 ENCOUNTER — HOSPITAL ENCOUNTER (OUTPATIENT)
Dept: INFUSION THERAPY | Age: 65
Discharge: HOME | End: 2025-02-20
Payer: MEDICARE

## 2025-02-20 VITALS
WEIGHT: 209.2 LBS | OXYGEN SATURATION: 96 % | BODY MASS INDEX: 30.98 KG/M2 | TEMPERATURE: 98.1 F | HEART RATE: 94 BPM | DIASTOLIC BLOOD PRESSURE: 70 MMHG | HEIGHT: 69 IN | SYSTOLIC BLOOD PRESSURE: 160 MMHG

## 2025-02-20 DIAGNOSIS — C61 CANCER OF PROSTATE (HCC): Primary | ICD-10-CM

## 2025-02-20 DIAGNOSIS — R53.83 FATIGUE, UNSPECIFIED TYPE: ICD-10-CM

## 2025-02-20 DIAGNOSIS — C61 CANCER OF PROSTATE (HCC): ICD-10-CM

## 2025-02-20 DIAGNOSIS — K92.1 HEMATOCHEZIA: ICD-10-CM

## 2025-02-20 LAB
BASOPHILS # BLD: 0.01 K/UL
BASOPHILS NFR BLD: 0 % (ref 0–1)
EOSINOPHIL # BLD: 0.08 K/UL
EOSINOPHILS RELATIVE PERCENT: 2 % (ref 0–3)
ERYTHROCYTE [DISTWIDTH] IN BLOOD BY AUTOMATED COUNT: 12.8 % (ref 11.7–14.9)
HCT VFR BLD AUTO: 43.2 % (ref 42–52)
HGB BLD-MCNC: 14.6 G/DL (ref 13.5–18)
LYMPHOCYTES NFR BLD: 0.58 K/UL
LYMPHOCYTES RELATIVE PERCENT: 13 % (ref 24–44)
MCH RBC QN AUTO: 30.5 PG (ref 27–31)
MCHC RBC AUTO-ENTMCNC: 33.8 G/DL (ref 32–36)
MCV RBC AUTO: 90.2 FL (ref 78–100)
MONOCYTES NFR BLD: 0.41 K/UL
MONOCYTES NFR BLD: 9 % (ref 0–4)
NEUTROPHILS NFR BLD: 76 % (ref 36–66)
NEUTS SEG NFR BLD: 3.43 K/UL
PLATELET # BLD AUTO: 214 K/UL (ref 140–440)
PMV BLD AUTO: 9.6 FL (ref 7.5–11.1)
RBC # BLD AUTO: 4.79 M/UL (ref 4.6–6.2)
WBC OTHER # BLD: 4.5 K/UL (ref 4–10.5)

## 2025-02-20 PROCEDURE — 99212 OFFICE O/P EST SF 10 MIN: CPT | Performed by: RADIOLOGY

## 2025-02-20 PROCEDURE — 36415 COLL VENOUS BLD VENIPUNCTURE: CPT

## 2025-02-20 PROCEDURE — 99214 OFFICE O/P EST MOD 30 MIN: CPT | Performed by: RADIOLOGY

## 2025-02-20 PROCEDURE — 85025 COMPLETE CBC W/AUTO DIFF WBC: CPT

## 2025-02-20 PROCEDURE — 99212 OFFICE O/P EST SF 10 MIN: CPT

## 2025-02-20 NOTE — PROGRESS NOTES
[facility]   Radiation Oncology Center  [address] [place] [address]  Phone: [phone]  Fax: [phone]    RADIATION ONCOLOGY FOLLOW UP REPORT    PATIENT NAME:  Dhaval Verduzco              : 1960  MEDICAL RECORD NO: [id number]    [facility] NO: [id number]        PROVIDER: Kroy Carey MD    DATE OF SERVICE: 2025     DIAGNOSIS:  favorable intermediate risk prostate cancer [place] 3+4, PSA 6.2, [id number], initially diagnosed [place] 3+3 low risk 2022      TREATMENT COURSE: definitive radiation therapy 70Gy/28fx completed 24    HPI:   Dhaval Verduzco is a 64 y.o. male who has a history as above who returns today for routine follow-up visit.  He was last seen in clinic 10/14/24 and doing well. PSA was 2.07 and not on any bladder meds.     PSA 1.93 on 25.  He has not had any bladder issues.  He is not on any medications for his bladder.  He denies any dysuria, hematuria, or frequency.  He does not have a lot of constipation.  At times it is looser.  He does take Imodium every now and then.    He has had some rectal bleeding for about 3 to 4 weeks.  It started off as bright red blood but now it is darker.  He has been seen by GI Dr. Culp and has a colonoscopy scheduled next Thursday.  He has had a lot of fatigue lately and he thinks this is attributing to less sex drive/erectile dysfunction.  He has much less energy over the past month or so.    RADIOLOGIC STUDIES:    Reviewed xrt plan and prior PSA's    LABORATORY STUDIES:   CBC:   Lab Results   Component Value Date/Time    WBC 5.2 2012 04:00 PM    RBC 4.87 2012 04:00 PM    HGB 14.6 2012 04:00 PM    HCT 42.4 2012 04:00 PM    MCV 87.0 2012 04:00 PM    MCH 30.1 2012 04:00 PM    MCHC 34.6 2012 04:00 PM    RDW 12.1 2012 04:00 PM     2012 04:00 PM    MPV 8.5 2012 04:00 PM     CMP:  Lab Results   Component Value Date/Time     2012

## 2025-02-20 NOTE — PROGRESS NOTES
Dhaval L Luba  2/20/2025    Patient is seen today for follow up.     Vitals:    02/20/25 0808   BP: (!) 160/70   Pulse: 94   Temp: 98.1 °F (36.7 °C)   SpO2: 96%        Oxygen Therapy  SpO2: 96 %  Pulse Oximetry Type: Intermittent  Pulse Oximeter Device Location: Finger  Oxygen Therapy: None (Room air)    Wt Readings from Last 3 Encounters:   02/20/25 94.9 kg (209 lb 3.2 oz)   10/14/24 94.6 kg (208 lb 9.6 oz)   06/12/24 93.7 kg (206 lb 9.6 oz)       Pain Assessment  Pain Assessment: None - Denies Pain  Denies Need for Intervention     No Known Allergies     Current Outpatient Medications   Medication Sig Dispense Refill    RABEprazole (ACIPHEX) 20 MG tablet TAKE ONCE BY MOUTH DAILY      lisinopril-hydrochlorothiazide (PRINZIDE;ZESTORETIC) 10-12.5 MG per tablet Take 1 tablet by mouth daily      pantoprazole (PROTONIX) 40 MG tablet Take 1 tablet by mouth daily      sildenafil (VIAGRA) 50 MG tablet Take 1 tablet by mouth as needed Patient states only takes one half to one quarter of a pill at a time       No current facility-administered medications for this encounter.        Additional Comments: Pt here for follow up.    Electronically signed by Vernell Myrick MA on 2/20/2025 at 8:10 AM

## 2025-03-07 ENCOUNTER — HOSPITAL ENCOUNTER (OUTPATIENT)
Dept: RADIATION ONCOLOGY | Age: 65
Discharge: HOME | End: 2025-03-07
Payer: MEDICARE

## 2025-03-07 VITALS
HEIGHT: 69 IN | TEMPERATURE: 98.1 F | DIASTOLIC BLOOD PRESSURE: 73 MMHG | HEART RATE: 66 BPM | SYSTOLIC BLOOD PRESSURE: 148 MMHG | WEIGHT: 209.6 LBS | RESPIRATION RATE: 16 BRPM | BODY MASS INDEX: 31.04 KG/M2 | OXYGEN SATURATION: 96 %

## 2025-03-07 DIAGNOSIS — C61 CANCER OF PROSTATE (HCC): Primary | ICD-10-CM

## 2025-03-07 PROCEDURE — 99212 OFFICE O/P EST SF 10 MIN: CPT | Performed by: RADIOLOGY

## 2025-03-07 RX ORDER — PREDNISONE 10 MG/1
TABLET ORAL
Qty: 70 TABLET | Refills: 0 | Status: SHIPPED | OUTPATIENT
Start: 2025-03-07 | End: 2025-04-04

## 2025-03-07 ASSESSMENT — PAIN SCALES - GENERAL: PAINLEVEL_OUTOF10: 4

## 2025-03-07 NOTE — PROGRESS NOTES
Dhaval MAGAÑA Luba  3/7/2025    Patient is seen today for follow up.     Vitals:    03/07/25 0807   BP: (!) 148/73   Pulse: 66   Resp: 16   Temp: 98.1 °F (36.7 °C)   SpO2: 96%        Oxygen Therapy  SpO2: 96 %  Pulse Oximetry Type: Intermittent  Pulse Oximeter Device Location: Finger  O2 Device: None (Room air)    Wt Readings from Last 3 Encounters:   03/07/25 95.1 kg (209 lb 9.6 oz)   02/20/25 94.9 kg (209 lb 3.2 oz)   10/14/24 94.6 kg (208 lb 9.6 oz)       Pain Assessment  Pain Assessment: 0-10  Pain Level: 4 (stomach pain)  Denies Need for Intervention     No Known Allergies     Current Outpatient Medications   Medication Sig Dispense Refill    RABEprazole (ACIPHEX) 20 MG tablet TAKE ONCE BY MOUTH DAILY      lisinopril-hydrochlorothiazide (PRINZIDE;ZESTORETIC) 10-12.5 MG per tablet Take 1 tablet by mouth daily      sildenafil (VIAGRA) 50 MG tablet Take 1 tablet by mouth as needed Patient states only takes one half to one quarter of a pill at a time       No current facility-administered medications for this encounter.        Additional Comments: pt here for a follow up    Electronically signed by Lizz Gonzalez MA on 3/7/2025 at 8:10 AM             
argon laser procedure as well as hyperbaric oxygen.    He wants to go to [facility] if still having issues in a month.     Time statement :   I spent 30 minutes providing this service today, to include the time spent seeing the patient documenting and reviewing chart excluding any separately billed procedures.    Electronically signed by Kory Carey MD on 3/7/2025 at 8:28 AM

## 2025-04-04 ENCOUNTER — HOSPITAL ENCOUNTER (OUTPATIENT)
Dept: INFUSION THERAPY | Age: 65
Discharge: HOME | End: 2025-04-04
Payer: MEDICARE

## 2025-04-04 DIAGNOSIS — C61 CANCER OF PROSTATE (HCC): ICD-10-CM

## 2025-04-04 LAB
BASOPHILS # BLD: 0.01 K/UL
BASOPHILS NFR BLD: 0 % (ref 0–1)
EOSINOPHIL # BLD: 0.14 K/UL
EOSINOPHILS RELATIVE PERCENT: 3 % (ref 0–3)
ERYTHROCYTE [DISTWIDTH] IN BLOOD BY AUTOMATED COUNT: 13.2 % (ref 11.7–14.9)
HCT VFR BLD AUTO: 42.7 % (ref 42–52)
HGB BLD-MCNC: 13.8 G/DL (ref 13.5–18)
LYMPHOCYTES NFR BLD: 0.62 K/UL
LYMPHOCYTES RELATIVE PERCENT: 12 % (ref 24–44)
MCH RBC QN AUTO: 30.5 PG (ref 27–31)
MCHC RBC AUTO-ENTMCNC: 32.3 G/DL (ref 32–36)
MCV RBC AUTO: 94.3 FL (ref 78–100)
MONOCYTES NFR BLD: 0.45 K/UL
MONOCYTES NFR BLD: 8 % (ref 0–4)
NEUTROPHILS NFR BLD: 77 % (ref 36–66)
NEUTS SEG NFR BLD: 4.14 K/UL
PLATELET # BLD AUTO: 176 K/UL (ref 140–440)
PMV BLD AUTO: 9.6 FL (ref 7.5–11.1)
RBC # BLD AUTO: 4.53 M/UL (ref 4.6–6.2)
WBC OTHER # BLD: 5.4 K/UL (ref 4–10.5)

## 2025-04-04 PROCEDURE — 85025 COMPLETE CBC W/AUTO DIFF WBC: CPT

## 2025-04-04 PROCEDURE — 36415 COLL VENOUS BLD VENIPUNCTURE: CPT

## 2025-04-07 ENCOUNTER — HOSPITAL ENCOUNTER (OUTPATIENT)
Dept: RADIATION ONCOLOGY | Age: 65
End: 2025-04-07
Payer: MEDICARE

## 2025-04-10 ENCOUNTER — HOSPITAL ENCOUNTER (OUTPATIENT)
Dept: RADIATION ONCOLOGY | Age: 65
Discharge: HOME | End: 2025-04-10
Payer: MEDICARE

## 2025-04-10 VITALS
HEIGHT: 69 IN | BODY MASS INDEX: 30.33 KG/M2 | SYSTOLIC BLOOD PRESSURE: 133 MMHG | HEART RATE: 64 BPM | WEIGHT: 204.8 LBS | TEMPERATURE: 98.9 F | DIASTOLIC BLOOD PRESSURE: 69 MMHG | RESPIRATION RATE: 16 BRPM | OXYGEN SATURATION: 96 %

## 2025-04-10 DIAGNOSIS — C61 CANCER OF PROSTATE (HCC): ICD-10-CM

## 2025-04-10 DIAGNOSIS — K62.7 PROCTITIS, RADIATION: ICD-10-CM

## 2025-04-10 DIAGNOSIS — R59.0 ENLARGED LYMPH NODE IN NECK: Primary | ICD-10-CM

## 2025-04-10 PROCEDURE — 99212 OFFICE O/P EST SF 10 MIN: CPT | Performed by: RADIOLOGY

## 2025-04-10 PROCEDURE — 99213 OFFICE O/P EST LOW 20 MIN: CPT | Performed by: RADIOLOGY

## 2025-04-10 RX ORDER — FLUTICASONE PROPIONATE 50 MCG
SPRAY, SUSPENSION (ML) NASAL
COMMUNITY
Start: 2025-03-06

## 2025-04-10 RX ORDER — PREDNISONE 10 MG/1
TABLET ORAL
Qty: 210 TABLET | Refills: 0 | Status: SHIPPED | OUTPATIENT
Start: 2025-04-11 | End: 2025-06-20

## 2025-04-10 RX ORDER — LOSARTAN POTASSIUM AND HYDROCHLOROTHIAZIDE 12.5; 5 MG/1; MG/1
TABLET ORAL
COMMUNITY
End: 2025-04-10

## 2025-04-10 RX ORDER — TRAZODONE HYDROCHLORIDE 50 MG/1
TABLET ORAL
COMMUNITY

## 2025-04-10 ASSESSMENT — PAIN SCALES - GENERAL: PAINLEVEL_OUTOF10: 0

## 2025-04-10 NOTE — PROGRESS NOTES
Dhaval MAGAÑA Luba  4/10/2025    Patient is seen today for follow up.     Vitals:    04/10/25 1128   BP: 133/69   Pulse: 64   Resp: 16   Temp: 98.9 °F (37.2 °C)   SpO2: 96%        Oxygen Therapy  SpO2: 96 %  Pulse Oximeter Device Mode: Continuous  Pulse Oximeter Device Location: Finger  O2 Device: None (Room air)  Skin Assessment: Clean, dry, & intact    Wt Readings from Last 3 Encounters:   04/10/25 92.9 kg (204 lb 12.8 oz)   03/07/25 95.1 kg (209 lb 9.6 oz)   02/20/25 94.9 kg (209 lb 3.2 oz)       Pain Assessment  Pain Assessment: None - Denies Pain  Pain Level: 0  Patient's Stated Pain Goal: 0 - No pain  Multiple Pain Sites: No  Denies Need for Intervention     No Known Allergies     Current Outpatient Medications   Medication Sig Dispense Refill    traZODone (DESYREL) 50 MG tablet Oral for 90 Days      fluticasone (FLONASE) 50 MCG/ACT nasal spray 1 spray in each nostril Nasally Twice a day for 30 days      LISINOPRIL-HYDROCHLOROTHIAZIDE PO Take by mouth      RABEprazole (ACIPHEX) 20 MG tablet TAKE ONCE BY MOUTH DAILY      sildenafil (VIAGRA) 50 MG tablet Take 1 tablet by mouth as needed Patient states only takes one half to one quarter of a pill at a time       No current facility-administered medications for this encounter.        Additional Comments: Pt here for follow up with RAD, states no new issues or concerns.     Electronically signed by Aye Andre CMA on 4/10/2025 at 11:34 AM

## 2025-04-10 NOTE — PROGRESS NOTES
Prednisone taper explained to pt. Previous handout with erroneous date. Updated taper explained and mailed to pt. Taper as listed below.  Prednisone Taper   ( 10mg tablets )  Please complete Prednisone taper as follows:  4/11/2025 … Take five tablets by mouth, 50mg daily x 14 days.  4/25/2025 … Take four tablets by mouth, 40mg daily x 14 days.  5/09/2025 … Take three tablets by mouth, 30mg daily x 14 days.  5/23/2025 … Take two tablets by mouth, 20mg daily x 14 days.  6/06/2025 … Take one tablet by mouth, 10mg daily x 14 days.  6/20/2025 … STOP PREDNISONE  If any questions call Radiation Nurse at [phone].

## 2025-04-10 NOTE — PROGRESS NOTES
[facility]   Radiation Oncology Center  [address] [place] [address]  Phone: [phone]  Fax: [phone]    RADIATION ONCOLOGY FOLLOW UP REPORT    PATIENT NAME:  Dhaval Verduzco              : 1960  MEDICAL RECORD NO: [id number]    [facility] NO: [id number]        PROVIDER: Kory Carey MD    DATE OF SERVICE: 4/10/2025     DIAGNOSIS:  favorable intermediate risk prostate cancer [place] 3+4, PSA 6.2, [id number], initially diagnosed [place] 3+3 low risk 2022      TREATMENT COURSE: definitive radiation therapy 70Gy/28fx completed 24    HPI:   Dhaval Verduzco is a 64 y.o. male who has a history as above who returns today for routine follow-up visit.  He was last seen in clinic 3/7/25.    He was rx'd prednisone taper last time here to see if can help stop the proctitis.     CBC 25 showed hg and hct wnl's.     He still having some rectal bleeding.  He did not have it for about a week started on high-dose steroids but as he went down dose and came back.  Unsure if painful or not but having some bleeding.  He always has stomach cramps at times and is not sure if they are worse or different.  Bladder is okay.    Has lymph node low R neck he noticed recently. Doesn't particularly hurt. No dysphagia or hemoptysis. No new lumps/bumps elsewhere. Doesn't smoke.     RADIOLOGIC STUDIES:      LABORATORY STUDIES:   CBC:   Lab Results   Component Value Date/Time    WBC 5.4 2025 08:07 AM    RBC 4.53 2025 08:07 AM    HGB 13.8 2025 08:07 AM    HCT 42.7 2025 08:07 AM    MCV 94.3 2025 08:07 AM    MCH 30.5 2025 08:07 AM    MCHC 32.3 2025 08:07 AM    RDW 13.2 2025 08:07 AM     2025 08:07 AM    MPV 9.6 2025 08:07 AM     CMP:  Lab Results   Component Value Date/Time     2012 04:00 PM    K 3.8 2012 04:00 PM     2012 04:00 PM    CO2 33 2012 04:00 PM    BUN 21 2012 04:00 PM

## 2025-04-18 ENCOUNTER — TELEPHONE (OUTPATIENT)
Dept: ONCOLOGY | Age: 65
End: 2025-04-18

## 2025-04-18 NOTE — TELEPHONE ENCOUNTER
4/18/25 - spoke to pt for the 4/28/25 CT soft tissue neck at [facility] arrive at 8:00 am and NPO 4

## 2025-04-28 ENCOUNTER — HOSPITAL ENCOUNTER (OUTPATIENT)
Dept: CT IMAGING | Age: 65
Discharge: HOME | End: 2025-04-28
Attending: RADIOLOGY
Payer: MEDICARE

## 2025-04-28 DIAGNOSIS — R59.0 ENLARGED LYMPH NODE IN NECK: ICD-10-CM

## 2025-04-28 LAB
EGFR, POC: >90 ML/MIN/1.73M2
POC CREATININE: 0.9 MG/DL (ref 0.5–1.2)

## 2025-04-28 PROCEDURE — 6360000004 HC RX CONTRAST MEDICATION: Performed by: RADIOLOGY

## 2025-04-28 PROCEDURE — 70491 CT SOFT TISSUE NECK W/DYE: CPT

## 2025-04-28 PROCEDURE — 82565 ASSAY OF CREATININE: CPT

## 2025-04-28 RX ORDER — IOPAMIDOL 755 MG/ML
75 INJECTION, SOLUTION INTRAVASCULAR
Status: COMPLETED | OUTPATIENT
Start: 2025-04-28 | End: 2025-04-28

## 2025-04-28 RX ADMIN — IOPAMIDOL 75 ML: 755 INJECTION, SOLUTION INTRAVENOUS at 08:28

## 2025-05-23 ENCOUNTER — TELEPHONE (OUTPATIENT)
Dept: PULMONOLOGY | Age: 65
End: 2025-05-23

## 2025-05-23 DIAGNOSIS — C61 CANCER OF PROSTATE (HCC): Primary | ICD-10-CM

## 2025-05-23 DIAGNOSIS — K62.7 PROCTITIS, RADIATION: ICD-10-CM

## 2025-05-23 DIAGNOSIS — K62.5 RECTAL BLEEDING: Primary | ICD-10-CM

## 2025-05-23 RX ORDER — OXYCODONE AND ACETAMINOPHEN 5; 325 MG/1; MG/1
1 TABLET ORAL EVERY 6 HOURS PRN
Qty: 40 TABLET | Refills: 0 | Status: SHIPPED | OUTPATIENT
Start: 2025-05-23 | End: 2025-06-02

## 2025-05-23 NOTE — PROGRESS NOTES
Pt calling clinic to report having rectal bleeding and pain at times with bowel movements. Pt states when taking steroids the bleeding stopped but once completed steroids bleeding and pain began again. Above discussed with Dr. Carey via telephone. Pain medication rx sent to [facility] on [address] per Dr. Carey. Pt referred to Gastro/ Dr. Nichols for evaluation. Referral faxed.

## 2025-06-24 ENCOUNTER — CLINICAL DOCUMENTATION (OUTPATIENT)
Dept: RADIATION ONCOLOGY | Age: 65
End: 2025-06-24

## 2025-06-24 NOTE — PROGRESS NOTES
This RN called [facility] Gastroenterology to check on availability of an earlier appt. Pt rescheduled with Dr. Blackmon on 7/3/2025 at 10 AM. Pt called and informed pt of change. [facility] info given to pt. Pt verbalizes understanding.

## 2025-07-01 ENCOUNTER — APPOINTMENT (OUTPATIENT)
Dept: RADIATION ONCOLOGY | Age: 65
End: 2025-07-01
Payer: MEDICARE

## 2025-07-02 ENCOUNTER — TELEPHONE (OUTPATIENT)
Dept: ONCOLOGY | Age: 65
End: 2025-07-02

## 2025-07-02 NOTE — TELEPHONE ENCOUNTER
Called patient to reschedule their No Show appointment on 7/2 with  . Spoke with patient and rescheduled appointment to 7/10 with  .

## 2025-07-03 ENCOUNTER — OFFICE VISIT (OUTPATIENT)
Dept: GASTROENTEROLOGY | Age: 65
End: 2025-07-03
Payer: MEDICARE

## 2025-07-03 VITALS
DIASTOLIC BLOOD PRESSURE: 78 MMHG | RESPIRATION RATE: 16 BRPM | HEIGHT: 69 IN | OXYGEN SATURATION: 97 % | HEART RATE: 65 BPM | BODY MASS INDEX: 31.25 KG/M2 | WEIGHT: 211 LBS | SYSTOLIC BLOOD PRESSURE: 138 MMHG

## 2025-07-03 DIAGNOSIS — K62.7 RADIATION PROCTITIS: ICD-10-CM

## 2025-07-03 DIAGNOSIS — K62.5 RECTAL BLEEDING: Primary | ICD-10-CM

## 2025-07-03 PROCEDURE — 1159F MED LIST DOCD IN RCRD: CPT | Performed by: INTERNAL MEDICINE

## 2025-07-03 PROCEDURE — 99204 OFFICE O/P NEW MOD 45 MIN: CPT | Performed by: INTERNAL MEDICINE

## 2025-07-03 PROCEDURE — 1124F ACP DISCUSS-NO DSCNMKR DOCD: CPT | Performed by: INTERNAL MEDICINE

## 2025-07-03 NOTE — PROGRESS NOTES
[facility] Gastroenterology and Hepatology      MD Gabriela Duran MD Carol Christensen, APRN-CNP       Gauri Troncoso, APRN-CNP             [address] [place] [address]             [phone] fax [phone]        Gastroenterology Clinic Consultation    Constance Blackmon MD  Encounter Date: 07/03/25     CC: New Patient (Pt is here for rectal bleeding- pt states the rectal bleeding is from cancer treatment )         History obtained from: patient and medical records     Subjective:     Dhaval Verduzco is an 64 y.o.  male who presents for rectal bleeding.    64-year-old pleasant male presents with rectal bleeding problem.  Apparently, he had completed treatment for prostate cancer with radiation in May 2024.  Apparently, he started having rectal bleeding 8 months later.  He has been having frequent rectal bleeding since then.  He is having daily rectal bleeding problem for the last 2 to 3 months.  Apparently, he had colonoscopy by Dr. Moreno and rectal biopsies.  He had the diagnosis of radiation proctopathy.  He has been using fiber supplements.  With no benefit with any therapies he had done so far.  He has painful defecation send severe sharp pain in the rectum with defecation.  He does have occasional incontinence episodes.  No abdominal pain.  Overall he does not have any hard stool and no significant straining during defecation.     Past Medical History:   Diagnosis Date    Hypertension         Past Surgical History:   Procedure Laterality Date    KNEE ARTHROSCOPY          Family History   Problem Relation Age of Onset    Heart Disease Father         Social History     Socioeconomic History    Marital status:      Spouse name: Not on file    Number of children: Not on file    Years of education: Not on file    Highest education level: Not on file   Occupational History    Not on file   Tobacco Use    Smoking

## 2025-07-07 ENCOUNTER — TRANSCRIBE ORDERS (OUTPATIENT)
Dept: ADMINISTRATIVE | Age: 65
End: 2025-07-07

## 2025-07-07 DIAGNOSIS — M79.89 OTHER SPECIFIED SOFT TISSUE DISORDERS: ICD-10-CM

## 2025-07-07 DIAGNOSIS — R20.2 PARESTHESIA OF SKIN: Primary | ICD-10-CM

## 2025-07-08 ENCOUNTER — HOSPITAL ENCOUNTER (OUTPATIENT)
Dept: INFUSION THERAPY | Age: 65
Discharge: HOME | End: 2025-07-08
Payer: MEDICARE

## 2025-07-08 DIAGNOSIS — C61 CANCER OF PROSTATE (HCC): ICD-10-CM

## 2025-07-08 LAB — PSA SERPL-MCNC: 1.69 NG/ML (ref 0–4)

## 2025-07-08 PROCEDURE — 36415 COLL VENOUS BLD VENIPUNCTURE: CPT

## 2025-07-08 PROCEDURE — 84153 ASSAY OF PSA TOTAL: CPT

## 2025-07-10 ENCOUNTER — HOSPITAL ENCOUNTER (OUTPATIENT)
Dept: INFUSION THERAPY | Age: 65
Discharge: HOME | End: 2025-07-10
Payer: MEDICARE

## 2025-07-10 ENCOUNTER — HOSPITAL ENCOUNTER (OUTPATIENT)
Dept: RADIATION ONCOLOGY | Age: 65
Discharge: HOME | End: 2025-07-10
Payer: MEDICARE

## 2025-07-10 VITALS
HEART RATE: 65 BPM | SYSTOLIC BLOOD PRESSURE: 143 MMHG | OXYGEN SATURATION: 100 % | WEIGHT: 207.4 LBS | TEMPERATURE: 99.1 F | BODY MASS INDEX: 30.72 KG/M2 | HEIGHT: 69 IN | RESPIRATION RATE: 16 BRPM | DIASTOLIC BLOOD PRESSURE: 59 MMHG

## 2025-07-10 DIAGNOSIS — C61 CANCER OF PROSTATE (HCC): ICD-10-CM

## 2025-07-10 DIAGNOSIS — C61 CANCER OF PROSTATE (HCC): Primary | ICD-10-CM

## 2025-07-10 LAB
BASOPHILS # BLD: 0.04 K/UL
BASOPHILS NFR BLD: 1 % (ref 0–1)
EOSINOPHIL # BLD: 0.08 K/UL
EOSINOPHILS RELATIVE PERCENT: 2 % (ref 0–3)
ERYTHROCYTE [DISTWIDTH] IN BLOOD BY AUTOMATED COUNT: 12.7 % (ref 11.7–14.9)
HCT VFR BLD AUTO: 39.9 % (ref 42–52)
HGB BLD-MCNC: 13.3 G/DL (ref 13.5–18)
LYMPHOCYTES NFR BLD: 0.7 K/UL
LYMPHOCYTES RELATIVE PERCENT: 15 % (ref 24–44)
MCH RBC QN AUTO: 30.8 PG (ref 27–31)
MCHC RBC AUTO-ENTMCNC: 33.3 G/DL (ref 32–36)
MCV RBC AUTO: 92.4 FL (ref 78–100)
MONOCYTES NFR BLD: 0.4 K/UL
MONOCYTES NFR BLD: 8 % (ref 0–5)
NEUTROPHILS NFR BLD: 75 % (ref 36–66)
NEUTS SEG NFR BLD: 3.62 K/UL
PLATELET # BLD AUTO: 207 K/UL (ref 140–440)
PMV BLD AUTO: 9.8 FL (ref 7.5–11.1)
RBC # BLD AUTO: 4.32 M/UL (ref 4.6–6.2)
WBC OTHER # BLD: 4.8 K/UL (ref 4–10.5)

## 2025-07-10 PROCEDURE — 36415 COLL VENOUS BLD VENIPUNCTURE: CPT

## 2025-07-10 PROCEDURE — 85025 COMPLETE CBC W/AUTO DIFF WBC: CPT

## 2025-07-10 PROCEDURE — 99214 OFFICE O/P EST MOD 30 MIN: CPT | Performed by: RADIOLOGY

## 2025-07-10 RX ORDER — DICYCLOMINE HCL 20 MG
TABLET ORAL
COMMUNITY
Start: 2025-07-07

## 2025-07-10 RX ORDER — LOSARTAN POTASSIUM AND HYDROCHLOROTHIAZIDE 12.5; 5 MG/1; MG/1
1 TABLET ORAL DAILY
COMMUNITY
Start: 2025-05-02

## 2025-07-10 ASSESSMENT — PAIN SCALES - GENERAL: PAINLEVEL_OUTOF10: 0

## 2025-07-10 NOTE — PROGRESS NOTES
Dhaval MAGAÑA Luba  7/10/2025    Patient is seen today for follow up.     Vitals:    07/10/25 0955   BP: (!) 143/59   Pulse: 65   Resp: 16   Temp: 99.1 °F (37.3 °C)   SpO2: 100%        Oxygen Therapy  SpO2: 100 %  Pulse Oximeter Device Mode: Continuous  Pulse Oximeter Device Location: Finger  O2 Device: None (Room air)    Wt Readings from Last 3 Encounters:   07/10/25 94.1 kg (207 lb 6.4 oz)   07/03/25 95.7 kg (211 lb)   04/10/25 92.9 kg (204 lb 12.8 oz)       Pain Assessment  Pain Assessment: None - Denies Pain  Pain Level: 0  Patient's Stated Pain Goal: 0 - No pain  Multiple Pain Sites: No  Denies Need for Intervention     No Known Allergies     Current Outpatient Medications   Medication Sig Dispense Refill    dicyclomine (BENTYL) 20 MG tablet       losartan-hydroCHLOROthiazide (HYZAAR) 50-12.5 MG per tablet Take 1 tablet by mouth daily      traZODone (DESYREL) 50 MG tablet Oral for 90 Days      fluticasone (FLONASE) 50 MCG/ACT nasal spray 1 spray in each nostril Nasally Twice a day for 30 days      RABEprazole (ACIPHEX) 20 MG tablet TAKE ONCE BY MOUTH DAILY      sildenafil (VIAGRA) 50 MG tablet Take 1 tablet by mouth as needed Patient states only takes one half to one quarter of a pill at a time       No current facility-administered medications for this encounter.        Additional Comments: PT here for follow up with RAD to review recent PSA results.  Pt requesting records be sent to PCP Mini William @ [facility].     Electronically signed by Aye Andre CMA on 7/10/2025 at 10:00 AM

## 2025-07-10 NOTE — PROGRESS NOTES
[facility]   Radiation Oncology Center  [address] [place] [address]  Phone: [phone]  Fax: [phone]    RADIATION ONCOLOGY FOLLOW UP REPORT    PATIENT NAME:  Dhaval Verduzco              : 1960  MEDICAL RECORD NO: [id number]    [facility] NO: [id number]        PROVIDER: Kory Carey MD    DATE OF SERVICE: 7/10/2025     DIAGNOSIS:  favorable intermediate risk prostate cancer [place] 3+4, PSA 6.2, [id number], initially diagnosed [place] 3+3 low risk 2022      TREATMENT COURSE: definitive radiation therapy 70Gy/28fx completed 24    HPI:   Dhaval Verduzco is a 64 y.o. male who has a history as above who returns today for routine follow-up visit.  He was last seen in clinic 4/10/25. PSA 1.93 on 25.    PSA 1.69 on 25.     CT neck 2025 showed no evidence of lymph nodes or disease.    He is being having issues with radiation proctitis.  He had a scope a while ago when he started having bleeding and some discomfort.  They did do a biopsy.  Try conservative measures of oral steroids which would help for a few weeks when on high-dose but once he went lower it started again.  He was referred to GI.    He has been seen by GI who discussed flexible sigmoidoscopy and APC ablation.    Hg today 13.3. does feel little tired at times. Takes prn pain medication for cramping.     RADIOLOGIC STUDIES:      LABORATORY STUDIES:   CBC:   Lab Results   Component Value Date/Time    WBC 5.4 2025 08:07 AM    RBC 4.53 2025 08:07 AM    HGB 13.8 2025 08:07 AM    HCT 42.7 2025 08:07 AM    MCV 94.3 2025 08:07 AM    MCH 30.5 2025 08:07 AM    MCHC 32.3 2025 08:07 AM    RDW 13.2 2025 08:07 AM     2025 08:07 AM    MPV 9.6 2025 08:07 AM     CMP:  Lab Results   Component Value Date/Time     2012 04:00 PM    K 3.8 2012 04:00 PM     2012 04:00 PM    CO2 33 2012 04:00 PM    BUN 21

## 2025-07-15 ENCOUNTER — HOSPITAL ENCOUNTER (OUTPATIENT)
Dept: ULTRASOUND IMAGING | Age: 65
Discharge: HOME | End: 2025-07-15
Payer: MEDICARE

## 2025-07-15 ENCOUNTER — HOSPITAL ENCOUNTER (OUTPATIENT)
Dept: GENERAL RADIOLOGY | Age: 65
Discharge: HOME | End: 2025-07-15
Payer: MEDICARE

## 2025-07-15 DIAGNOSIS — R20.2 PARESTHESIA OF SKIN: ICD-10-CM

## 2025-07-15 DIAGNOSIS — M79.89 OTHER SPECIFIED SOFT TISSUE DISORDERS: ICD-10-CM

## 2025-07-15 DIAGNOSIS — R31.9 LOIN PAIN-HEMATURIA SYNDROME: ICD-10-CM

## 2025-07-15 DIAGNOSIS — M54.50 LOIN PAIN-HEMATURIA SYNDROME: ICD-10-CM

## 2025-07-15 PROCEDURE — 72100 X-RAY EXAM L-S SPINE 2/3 VWS: CPT

## 2025-07-15 PROCEDURE — 93971 EXTREMITY STUDY: CPT

## 2025-07-16 DIAGNOSIS — C61 CANCER OF PROSTATE (HCC): ICD-10-CM

## 2025-07-16 DIAGNOSIS — K62.7 PROCTITIS, RADIATION: Primary | ICD-10-CM
